# Patient Record
Sex: FEMALE | Race: OTHER | HISPANIC OR LATINO | Employment: UNEMPLOYED | URBAN - METROPOLITAN AREA
[De-identification: names, ages, dates, MRNs, and addresses within clinical notes are randomized per-mention and may not be internally consistent; named-entity substitution may affect disease eponyms.]

---

## 2024-11-19 ENCOUNTER — TELEPHONE (OUTPATIENT)
Dept: HEMATOLOGY ONCOLOGY | Facility: MEDICAL CENTER | Age: 29
End: 2024-11-19

## 2024-11-19 ENCOUNTER — TELEPHONE (OUTPATIENT)
Dept: URGENT CARE | Facility: PHYSICIAN GROUP | Age: 29
End: 2024-11-19
Payer: COMMERCIAL

## 2024-11-20 NOTE — TELEPHONE ENCOUNTER
Patient called, asking how she could get an emergency iron infusion.    Patient currently sees a holostic doctor, and had labs drawn.  She is 6 weeks pregnant, and anemic.    PAR advised that the patient get a referral faxed in with the pertinent medical records.

## 2024-11-20 NOTE — TELEPHONE ENCOUNTER
Pt called and LVM asking if she can check into an UC to get a blood infusion performed. She noted that she pregnant and anemic and needing to get an emergency one dose as soon as possible. Talked to provider Serafin Tsai PA-C in office who noted that patient will need to be seen by oncologist/hematologist/ her OBGYN to get this ordered and will likely need to get it done at an infusion center. Notified pt of this and pt understood.

## 2024-11-22 ENCOUNTER — RESEARCH ENCOUNTER (OUTPATIENT)
Dept: RESEARCH | Facility: MEDICAL CENTER | Age: 29
End: 2024-11-22

## 2024-11-22 ENCOUNTER — OFFICE VISIT (OUTPATIENT)
Dept: MEDICAL GROUP | Facility: MEDICAL CENTER | Age: 29
End: 2024-11-22
Payer: COMMERCIAL

## 2024-11-22 VITALS
SYSTOLIC BLOOD PRESSURE: 124 MMHG | WEIGHT: 150 LBS | BODY MASS INDEX: 24.99 KG/M2 | DIASTOLIC BLOOD PRESSURE: 58 MMHG | TEMPERATURE: 99 F | OXYGEN SATURATION: 98 % | HEIGHT: 65 IN | HEART RATE: 72 BPM

## 2024-11-22 DIAGNOSIS — Z00.00 ENCOUNTER FOR PREVENTIVE CARE: ICD-10-CM

## 2024-11-22 DIAGNOSIS — E61.1 IRON DEFICIENCY: ICD-10-CM

## 2024-11-22 PROCEDURE — 3078F DIAST BP <80 MM HG: CPT | Performed by: STUDENT IN AN ORGANIZED HEALTH CARE EDUCATION/TRAINING PROGRAM

## 2024-11-22 PROCEDURE — 99204 OFFICE O/P NEW MOD 45 MIN: CPT | Performed by: STUDENT IN AN ORGANIZED HEALTH CARE EDUCATION/TRAINING PROGRAM

## 2024-11-22 PROCEDURE — 3074F SYST BP LT 130 MM HG: CPT | Performed by: STUDENT IN AN ORGANIZED HEALTH CARE EDUCATION/TRAINING PROGRAM

## 2024-11-22 SDOH — ECONOMIC STABILITY: INCOME INSECURITY: IN THE LAST 12 MONTHS, WAS THERE A TIME WHEN YOU WERE NOT ABLE TO PAY THE MORTGAGE OR RENT ON TIME?: NO

## 2024-11-22 SDOH — ECONOMIC STABILITY: HOUSING INSECURITY
IN THE LAST 12 MONTHS, WAS THERE A TIME WHEN YOU DID NOT HAVE A STEADY PLACE TO SLEEP OR SLEPT IN A SHELTER (INCLUDING NOW)?: NO

## 2024-11-22 SDOH — HEALTH STABILITY: PHYSICAL HEALTH: ON AVERAGE, HOW MANY MINUTES DO YOU ENGAGE IN EXERCISE AT THIS LEVEL?: 30 MIN

## 2024-11-22 SDOH — ECONOMIC STABILITY: INCOME INSECURITY: HOW HARD IS IT FOR YOU TO PAY FOR THE VERY BASICS LIKE FOOD, HOUSING, MEDICAL CARE, AND HEATING?: NOT VERY HARD

## 2024-11-22 SDOH — HEALTH STABILITY: MENTAL HEALTH
STRESS IS WHEN SOMEONE FEELS TENSE, NERVOUS, ANXIOUS, OR CAN'T SLEEP AT NIGHT BECAUSE THEIR MIND IS TROUBLED. HOW STRESSED ARE YOU?: ONLY A LITTLE

## 2024-11-22 SDOH — ECONOMIC STABILITY: FOOD INSECURITY: WITHIN THE PAST 12 MONTHS, YOU WORRIED THAT YOUR FOOD WOULD RUN OUT BEFORE YOU GOT MONEY TO BUY MORE.: NEVER TRUE

## 2024-11-22 SDOH — ECONOMIC STABILITY: TRANSPORTATION INSECURITY
IN THE PAST 12 MONTHS, HAS LACK OF TRANSPORTATION KEPT YOU FROM MEETINGS, WORK, OR FROM GETTING THINGS NEEDED FOR DAILY LIVING?: NO

## 2024-11-22 SDOH — HEALTH STABILITY: PHYSICAL HEALTH: ON AVERAGE, HOW MANY DAYS PER WEEK DO YOU ENGAGE IN MODERATE TO STRENUOUS EXERCISE (LIKE A BRISK WALK)?: 4 DAYS

## 2024-11-22 SDOH — ECONOMIC STABILITY: TRANSPORTATION INSECURITY
IN THE PAST 12 MONTHS, HAS LACK OF RELIABLE TRANSPORTATION KEPT YOU FROM MEDICAL APPOINTMENTS, MEETINGS, WORK OR FROM GETTING THINGS NEEDED FOR DAILY LIVING?: NO

## 2024-11-22 SDOH — ECONOMIC STABILITY: FOOD INSECURITY: WITHIN THE PAST 12 MONTHS, THE FOOD YOU BOUGHT JUST DIDN'T LAST AND YOU DIDN'T HAVE MONEY TO GET MORE.: NEVER TRUE

## 2024-11-22 SDOH — ECONOMIC STABILITY: TRANSPORTATION INSECURITY
IN THE PAST 12 MONTHS, HAS THE LACK OF TRANSPORTATION KEPT YOU FROM MEDICAL APPOINTMENTS OR FROM GETTING MEDICATIONS?: NO

## 2024-11-22 ASSESSMENT — SOCIAL DETERMINANTS OF HEALTH (SDOH)
IN A TYPICAL WEEK, HOW MANY TIMES DO YOU TALK ON THE PHONE WITH FAMILY, FRIENDS, OR NEIGHBORS?: MORE THAN THREE TIMES A WEEK
HOW MANY DRINKS CONTAINING ALCOHOL DO YOU HAVE ON A TYPICAL DAY WHEN YOU ARE DRINKING: PATIENT DOES NOT DRINK
HOW OFTEN DO YOU HAVE SIX OR MORE DRINKS ON ONE OCCASION: NEVER
HOW OFTEN DO YOU ATTENT MEETINGS OF THE CLUB OR ORGANIZATION YOU BELONG TO?: MORE THAN 4 TIMES PER YEAR
HOW OFTEN DO YOU GET TOGETHER WITH FRIENDS OR RELATIVES?: ONCE A WEEK
WITHIN THE PAST 12 MONTHS, YOU WORRIED THAT YOUR FOOD WOULD RUN OUT BEFORE YOU GOT THE MONEY TO BUY MORE: NEVER TRUE
DO YOU BELONG TO ANY CLUBS OR ORGANIZATIONS SUCH AS CHURCH GROUPS UNIONS, FRATERNAL OR ATHLETIC GROUPS, OR SCHOOL GROUPS?: YES
HOW OFTEN DO YOU GET TOGETHER WITH FRIENDS OR RELATIVES?: ONCE A WEEK
HOW HARD IS IT FOR YOU TO PAY FOR THE VERY BASICS LIKE FOOD, HOUSING, MEDICAL CARE, AND HEATING?: NOT VERY HARD
IN A TYPICAL WEEK, HOW MANY TIMES DO YOU TALK ON THE PHONE WITH FAMILY, FRIENDS, OR NEIGHBORS?: MORE THAN THREE TIMES A WEEK
HOW OFTEN DO YOU ATTEND CHURCH OR RELIGIOUS SERVICES?: MORE THAN 4 TIMES PER YEAR
IN THE PAST 12 MONTHS, HAS THE ELECTRIC, GAS, OIL, OR WATER COMPANY THREATENED TO SHUT OFF SERVICE IN YOUR HOME?: NO
HOW OFTEN DO YOU ATTENT MEETINGS OF THE CLUB OR ORGANIZATION YOU BELONG TO?: MORE THAN 4 TIMES PER YEAR
HOW OFTEN DO YOU HAVE A DRINK CONTAINING ALCOHOL: NEVER
DO YOU BELONG TO ANY CLUBS OR ORGANIZATIONS SUCH AS CHURCH GROUPS UNIONS, FRATERNAL OR ATHLETIC GROUPS, OR SCHOOL GROUPS?: YES
HOW OFTEN DO YOU ATTEND CHURCH OR RELIGIOUS SERVICES?: MORE THAN 4 TIMES PER YEAR

## 2024-11-22 ASSESSMENT — ENCOUNTER SYMPTOMS
COUGH: 0
HEMOPTYSIS: 0
FEVER: 0
CHILLS: 0
PALPITATIONS: 0

## 2024-11-22 ASSESSMENT — PATIENT HEALTH QUESTIONNAIRE - PHQ9
SUM OF ALL RESPONSES TO PHQ QUESTIONS 1-9: 7
5. POOR APPETITE OR OVEREATING: 0 - NOT AT ALL
CLINICAL INTERPRETATION OF PHQ2 SCORE: 5

## 2024-11-22 ASSESSMENT — LIFESTYLE VARIABLES
AUDIT-C TOTAL SCORE: 0
HOW MANY STANDARD DRINKS CONTAINING ALCOHOL DO YOU HAVE ON A TYPICAL DAY: PATIENT DOES NOT DRINK
SKIP TO QUESTIONS 9-10: 1
HOW OFTEN DO YOU HAVE A DRINK CONTAINING ALCOHOL: NEVER
HOW OFTEN DO YOU HAVE SIX OR MORE DRINKS ON ONE OCCASION: NEVER

## 2024-11-22 NOTE — PROGRESS NOTES
"Ruth Balbuena is a 28 y.o. old female  who is here to establish care   Chief Complaint   Patient presents with    Establish Care     New to You    Other     Patient just found out she is pregnant// patient has gotten a full panel of labs// constipation/               Patient is here to establish care. She follows holistic medicine provider and received iron infusion previously in 2020. She had fibroid and increased bleeding previously during menstruation lasting for  7 to 15. She has history of PCOS and has not taken treatment. She recently found that she is pregnant in October.   Lab reports from outside lab in October shows Total iron normal range , percent saturation  14 (normal range 16 to 45), 65 percent, ferritin 14(normal 16 to 154). Hb at upper limit of normal  Last Pap smear 2020     Review of Systems   Constitutional:  Negative for chills and fever.   Respiratory:  Negative for cough and hemoptysis.    Cardiovascular:  Negative for chest pain and palpitations.        Patient  has no past medical history on file.  Patient  has no past surgical history on file.  History reviewed. No pertinent family history.  Social History     Tobacco Use    Smoking status: Never    Smokeless tobacco: Never   Vaping Use    Vaping status: Never Used   Substance Use Topics    Alcohol use: Never    Drug use: Never     Patient Active Problem List    Diagnosis Date Noted    Iron deficiency 11/22/2024     No current outpatient medications on file.     No current facility-administered medications for this visit.    (including changes today)  Allergies: Seasonal    /58 (BP Location: Right arm, Patient Position: Sitting, BP Cuff Size: Adult)   Pulse 72   Temp 37.2 °C (99 °F) (Temporal)   Ht 1.651 m (5' 5\")   Wt 68 kg (150 lb)   SpO2 98%      Physical Exam  Constitutional:       Appearance: Normal appearance.   Cardiovascular:      Pulses: Normal pulses.      Heart sounds: Normal heart sounds. No murmur heard.  Pulmonary: "      Effort: Pulmonary effort is normal. No respiratory distress.      Breath sounds: Normal breath sounds.   Neurological:      Mental Status: She is alert and oriented to person, place, and time.          Health maintenance:   Last Pap smear 2020  I reviewed with patient  lab results     Assessment and plan:    Problem List Items Addressed This Visit       Iron deficiency     -Her Hb in normal range. She can take oral iron. Iron deficiency secondary to increased mens bleeding.Given she is pregnant and last mens cycle more than a month ago, I will hold off on iron infusion at this time.She has an upcoming appointment with Ob gyn          Other Visit Diagnoses       Encounter for preventive care        Relevant Orders    Referral to Genetic Research Studies                Return if symptoms worsen or fail to improve.          Please note that this dictation was created using voice recognition software. I have made every reasonable attempt to correct obvious errors, but I expect that there are errors of grammar and possibly content that I did not discover before finalizing the note.

## 2024-11-22 NOTE — RESEARCH NOTE
Patient has been referred by Jessy Woo M.D. The initial referral follow-up message, which includes the consent form link, has been sent to the patient.    Eligible Studies: HNP

## 2024-11-25 NOTE — ASSESSMENT & PLAN NOTE
-Her Hb in normal range. She can take oral iron. Iron deficiency secondary to increased mens bleeding.Given she is pregnant and last mens cycle more than a month ago, I will hold off on iron infusion at this time.She has an upcoming appointment with Ob gyn

## 2024-12-05 NOTE — PROGRESS NOTES
Pregnancy Confirmation Clinic Note    Ruth Balbuena is a 28 y.o.  who presents due to missed menses with a +UPT.     CC: Missed menses and +UPT    HPI:  Patient was seen in the ER on  for vaginal bleeding, per patient found to have subchorionic hemorrhage. Also found to have UTI and given Keflex antibiotics. Also orally hydrated.     Actively seeking pregnancy since February.    History of PCOS? Uterine Fibroid. Was seeing an infertility doctor in [place]. Was not receiving fertility care.    Patient's last menstrual period was 10/04/2024 (exact date)..    She is sure of her LMP.    Based on LMP, she is 9w6d today.     She was not using anything for birth control.    This is was not a planned pregnancy.    She reports nausea, denies vomiting, reports vaginal bleeding/spotting, and reports cramping.      OB History:    OB History    Para Term  AB Living   1             SAB IAB Ectopic Molar Multiple Live Births                    # Outcome Date GA Lbr [place]/2nd Weight Sex Type Anes PTL Lv   1 Current                ROS:  Gen: denies fevers/chills, denies changes in weight  Pulm: denies shortness of breath, denies cough  CV: denies chest pain, denies palpitations  GI: +nausea, -vomiting, denies diarrhea or constipation  : denies dysuria, denies vaginal discharge or odor  Skin: denies rash     Objective     Vitals:  /67   Wt 155 lb   Body mass index is 25.79 kg/m². (Goal BMI >18 and <25)    Exam:  Gen: Alert and oriented, No apparent distress.  Lungs: Breathing comfortably on room air, no cough  CV: Extremities are warm and well perfused, no BLE edema  MSK: Normal movement of extremities, gait normal    :  Vulva: normal.    Urethra: normal.   Vagina: normal.    Cervix: normal.    Uterus: normal   Adnexa: not palpable   Perineum: normal.    Chaperone name: Dolly was present during exam.      Procedure:  Transvaginal US performed by me and per my read:    Indication: Missed menses,  +UPT.     Findings:   Single intrauterine pregnancy @ 9w6d by LMP  yolk sac: present  fetal cardiac activity: 178  Right ovary not visualized  Left Ovary not visualized  free fluid in the cul-de-sac: not visualized    Impression:   Viable SIUP @ 10w5d.  RADHA by US of 25  RADHA by LMP: 2025  Final RADHA: 25       Assessment and Plan     Ruth Balbuena is 28 y.o.  here for pregnancy confirmation visit.    #SIUP at 9wk 6days by sure LMP c/w 9wk US, RADHA 25.   - Normal pregnancy s/sx discussed  - Continue prenatal vitamins once daily, any brand is OK  - Prenatal Labs ordered  - Counseled on aneuploidy and carrier screening tests:   She currently accept aneuploidy screenings and accept carrier screening  - SAB precautions discussed.  - Discussed the size of our practice and that she will see multiple providers during the course of her care. She expresses understanding.     #subchorionic hemorrhage  #vaginal bleeding in first trimester  - patient requesting additional ultrasound for reassurance    #Nausea/Vomiting  - Reviewed starting treatment for routine N/V of pregnancy if present with Vitamin B6 10mg three times daily and Unisom 10mg nightly.   - Call for prescription if this is not adequately treating N/V.    #excess weight gain  #pre-preg 145 lb, BMI 24  - BMI 18.5 - 24.9: IOM weight gain goal 25-35 lb for entire pregnancy  - total weight gain so far: 10lb  - encourage daily exercise, at least 30 minutes  - continue healthy diet with mostly fruit and vegetables  - Increase water intake and encouraged healthy nutrition.   - Encouraged moderate exercise may continue into final trimester.     #pre-eclampsia prophylaxis  - not recommend aspirin 81mg once daily for duration of pregnancy  - risk factors: nulliparity    #cervical cancer screening  - collected today  - if normal, next due in 5 years    #Health Care Maintenance  - Flu vaccine, recommended annually  - COVID vaccines, recommended, vaccinated  x3  - Tdap vaccine after 28 weeks gestation  - Continue routine dental care twice per year    1. Encounter for supervision of normal first pregnancy in first trimester  - Cystic Fibrosis (CFTR) Expanded Variant Panel; Future  - VARICELLA ZOSTER IGG AB; Future  - Quantiferon Gold Plus TB (4 tube); Future  - PRENATAL PANEL 3+HIV+UACXI; Future  - PANORAMA PRENATAL TEST  - THINPREP PAP W/HPV AND CTNG; Future  - US-OB 1ST TRIMESTER SINGLE GEST; Future    2. Encounter for pregnancy test, result unknown  - POCT Pregnancy    3. Cervical cancer screening  - THINPREP PAP W/HPV AND CTNG; Future    4. Routine screening for STI (sexually transmitted infection)  - THINPREP PAP W/HPV AND CTNG; Future    5. Subchorionic hemorrhage in first trimester  - US-OB 1ST TRIMESTER SINGLE GEST; Future    Other orders  - Prenatal MV-Min-Fe Fum-FA-DHA (PRENATAL 1 PO); Take  by mouth.  - cephALEXin (KEFLEX) 500 MG Cap  - Magnesium Citrate 125 MG Cap; Take 150 mg by mouth 2 times a day.       No problem-specific Assessment & Plan notes found for this encounter.      Follow up in 4 weeks for next OB visit.    Claudine Peterson MD, MPH

## 2024-12-12 ENCOUNTER — HOSPITAL ENCOUNTER (OUTPATIENT)
Facility: MEDICAL CENTER | Age: 29
End: 2024-12-12
Attending: FAMILY MEDICINE
Payer: COMMERCIAL

## 2024-12-12 ENCOUNTER — INITIAL PRENATAL (OUTPATIENT)
Dept: OBGYN | Facility: CLINIC | Age: 29
End: 2024-12-12
Payer: COMMERCIAL

## 2024-12-12 VITALS — SYSTOLIC BLOOD PRESSURE: 114 MMHG | DIASTOLIC BLOOD PRESSURE: 67 MMHG | WEIGHT: 155 LBS | BODY MASS INDEX: 25.79 KG/M2

## 2024-12-12 DIAGNOSIS — Z12.4 CERVICAL CANCER SCREENING: ICD-10-CM

## 2024-12-12 DIAGNOSIS — Z34.01 ENCOUNTER FOR SUPERVISION OF NORMAL FIRST PREGNANCY IN FIRST TRIMESTER: ICD-10-CM

## 2024-12-12 DIAGNOSIS — Z34.01 ENCOUNTER FOR SUPERVISION OF NORMAL FIRST PREGNANCY IN FIRST TRIMESTER: Primary | ICD-10-CM

## 2024-12-12 DIAGNOSIS — Z32.00 ENCOUNTER FOR PREGNANCY TEST, RESULT UNKNOWN: ICD-10-CM

## 2024-12-12 DIAGNOSIS — Z11.3 ROUTINE SCREENING FOR STI (SEXUALLY TRANSMITTED INFECTION): ICD-10-CM

## 2024-12-12 DIAGNOSIS — O20.8 SUBCHORIONIC HEMORRHAGE IN FIRST TRIMESTER: ICD-10-CM

## 2024-12-12 LAB
POCT INT CON NEG: NEGATIVE
POCT INT CON POS: POSITIVE
POCT URINE PREGNANCY TEST: POSITIVE

## 2024-12-12 PROCEDURE — 88142 CYTOPATH C/V THIN LAYER: CPT

## 2024-12-12 PROCEDURE — 87491 CHLMYD TRACH DNA AMP PROBE: CPT

## 2024-12-12 PROCEDURE — 0500F INITIAL PRENATAL CARE VISIT: CPT | Performed by: FAMILY MEDICINE

## 2024-12-12 PROCEDURE — 87624 HPV HI-RISK TYP POOLED RSLT: CPT

## 2024-12-12 PROCEDURE — 81025 URINE PREGNANCY TEST: CPT | Performed by: FAMILY MEDICINE

## 2024-12-12 PROCEDURE — 87591 N.GONORRHOEAE DNA AMP PROB: CPT

## 2024-12-12 PROCEDURE — 3074F SYST BP LT 130 MM HG: CPT | Performed by: FAMILY MEDICINE

## 2024-12-12 PROCEDURE — 3078F DIAST BP <80 MM HG: CPT | Performed by: FAMILY MEDICINE

## 2024-12-12 RX ORDER — ASCORBIC ACID 125 MG
150 TABLET,CHEWABLE ORAL 2 TIMES DAILY
COMMUNITY

## 2024-12-12 RX ORDER — CEPHALEXIN 500 MG/1
CAPSULE ORAL
COMMUNITY
Start: 2024-12-10

## 2024-12-12 ASSESSMENT — EDINBURGH POSTNATAL DEPRESSION SCALE (EPDS)
I HAVE BEEN SO UNHAPPY THAT I HAVE BEEN CRYING: ONLY OCCASIONALLY
I HAVE BEEN ANXIOUS OR WORRIED FOR NO GOOD REASON: YES, SOMETIMES
THINGS HAVE BEEN GETTING ON TOP OF ME: NO, MOST OF THE TIME I HAVE COPED QUITE WELL
TOTAL SCORE: 8
I HAVE BLAMED MYSELF UNNECESSARILY WHEN THINGS WENT WRONG: NOT VERY OFTEN
I HAVE BEEN SO UNHAPPY THAT I HAVE HAD DIFFICULTY SLEEPING: NOT AT ALL
I HAVE FELT SCARED OR PANICKY FOR NO GOOD REASON: YES, SOMETIMES
I HAVE FELT SAD OR MISERABLE: NOT VERY OFTEN
THE THOUGHT OF HARMING MYSELF HAS OCCURRED TO ME: NEVER
I HAVE BEEN ABLE TO LAUGH AND SEE THE FUNNY SIDE OF THINGS: AS MUCH AS I ALWAYS COULD
I HAVE LOOKED FORWARD WITH ENJOYMENT TO THINGS: AS MUCH AS I EVER DID

## 2024-12-12 NOTE — PROGRESS NOTES
Patient here for New OB   LMP: 10/4/24  RADHA: 7/11/25  GA: 9w6d  Last pap:3+ years, normal   Phone: [phone]  Pharmacy verified   EDPS: 8  Genetic testing: NIPT, and AFP later on   FLU: Declined     Pt states she has records from multiple sources, but she wants to go over her subchronic hemorrhage and make sure she is okay.   ER ([facility]) a couple night ago, is taking antibiotics for UTI, and subchronic hemorrhage shown on US

## 2024-12-13 ENCOUNTER — TELEPHONE (OUTPATIENT)
Dept: OBGYN | Facility: CLINIC | Age: 29
End: 2024-12-13
Payer: COMMERCIAL

## 2024-12-13 LAB
C TRACH DNA GENITAL QL NAA+PROBE: NEGATIVE
N GONORRHOEA DNA GENITAL QL NAA+PROBE: NEGATIVE
SPECIMEN SOURCE: NORMAL

## 2024-12-13 NOTE — TELEPHONE ENCOUNTER
Caller Name: Ruth Balbuena    Call Back Number: [phone]      How would the patient prefer to be contacted with a response: Phone call do NOT leave a detailed message    Pt called asking if provider can change us for stat since ultra sound is all the way out to the 16 and she as a appointment that day with the provider. Pt states provider will like her to get it done soon.   Send message to provider MA, she will call after lunch to get more information.

## 2024-12-13 NOTE — TELEPHONE ENCOUNTER
Phone Number Called: [phone]\    Call outcome: Spoke to patient regarding message below.    Message: Pt states that won't be able to get in to get that follow up US until 1/16 @ 4:30 which in the afternoon after her appt with us on 1/16 for her follow up. She states that she is wondering if we can have another doctor in office redo the US or have a STAT order placed so she can get it sooner before her next OBFV. I informed patient no matter what provider does it in office, it will be considered an unoffical, therefore, I will ask Dr. Peterson if we can get a different order placed, such as STAT, so we can get it sooner if she thinks the indication for that is needed. PT understood and will await my response.

## 2024-12-17 ENCOUNTER — HOSPITAL ENCOUNTER (OUTPATIENT)
Dept: LAB | Facility: MEDICAL CENTER | Age: 29
End: 2024-12-17
Attending: FAMILY MEDICINE
Payer: COMMERCIAL

## 2024-12-17 DIAGNOSIS — Z34.01 ENCOUNTER FOR SUPERVISION OF NORMAL FIRST PREGNANCY IN FIRST TRIMESTER: ICD-10-CM

## 2024-12-17 LAB
ABO GROUP BLD: NORMAL
APPEARANCE UR: CLEAR
BACTERIA #/AREA URNS HPF: ABNORMAL /HPF
BASOPHILS # BLD AUTO: 0.6 % (ref 0–1.8)
BASOPHILS # BLD: 0.06 K/UL (ref 0–0.12)
BILIRUB UR QL STRIP.AUTO: NEGATIVE
BLD GP AB SCN SERPL QL: NORMAL
CASTS URNS QL MICRO: ABNORMAL /LPF (ref 0–2)
COLOR UR: YELLOW
EOSINOPHIL # BLD AUTO: 0.24 K/UL (ref 0–0.51)
EOSINOPHIL NFR BLD: 2.3 % (ref 0–6.9)
EPITHELIAL CELLS 1715: ABNORMAL /HPF (ref 0–5)
ERYTHROCYTE [DISTWIDTH] IN BLOOD BY AUTOMATED COUNT: 40.8 FL (ref 35.9–50)
GLUCOSE UR STRIP.AUTO-MCNC: NEGATIVE MG/DL
HBV SURFACE AG SER QL: ABNORMAL
HCT VFR BLD AUTO: 42.9 % (ref 37–47)
HGB BLD-MCNC: 13.9 G/DL (ref 12–16)
HIV 1+2 AB+HIV1 P24 AG SERPL QL IA: NORMAL
IMM GRANULOCYTES # BLD AUTO: 0.05 K/UL (ref 0–0.11)
IMM GRANULOCYTES NFR BLD AUTO: 0.5 % (ref 0–0.9)
KETONES UR STRIP.AUTO-MCNC: NEGATIVE MG/DL
LEUKOCYTE ESTERASE UR QL STRIP.AUTO: NEGATIVE
LYMPHOCYTES # BLD AUTO: 2.09 K/UL (ref 1–4.8)
LYMPHOCYTES NFR BLD: 20 % (ref 22–41)
MCH RBC QN AUTO: 27.4 PG (ref 27–33)
MCHC RBC AUTO-ENTMCNC: 32.4 G/DL (ref 32.2–35.5)
MCV RBC AUTO: 84.6 FL (ref 81.4–97.8)
MICRO URNS: ABNORMAL
MONOCYTES # BLD AUTO: 0.56 K/UL (ref 0–0.85)
MONOCYTES NFR BLD AUTO: 5.3 % (ref 0–13.4)
NEUTROPHILS # BLD AUTO: 7.47 K/UL (ref 1.82–7.42)
NEUTROPHILS NFR BLD: 71.3 % (ref 44–72)
NITRITE UR QL STRIP.AUTO: NEGATIVE
NRBC # BLD AUTO: 0 K/UL
NRBC BLD-RTO: 0 /100 WBC (ref 0–0.2)
PH UR STRIP.AUTO: 7 [PH] (ref 5–8)
PLATELET # BLD AUTO: 421 K/UL (ref 164–446)
PMV BLD AUTO: 10.1 FL (ref 9–12.9)
PROT UR QL STRIP: NEGATIVE MG/DL
RBC # BLD AUTO: 5.07 M/UL (ref 4.2–5.4)
RBC # URNS HPF: ABNORMAL /HPF (ref 0–2)
RBC UR QL AUTO: ABNORMAL
RH BLD: NORMAL
RUBV AB SER QL: 115 IU/ML
SP GR UR STRIP.AUTO: 1.02
T PALLIDUM AB SER QL IA: ABNORMAL
UROBILINOGEN UR STRIP.AUTO-MCNC: 1 EU/DL
WBC # BLD AUTO: 10.5 K/UL (ref 4.8–10.8)
WBC #/AREA URNS HPF: ABNORMAL /HPF

## 2024-12-17 PROCEDURE — 86850 RBC ANTIBODY SCREEN: CPT

## 2024-12-17 PROCEDURE — 85025 COMPLETE CBC W/AUTO DIFF WBC: CPT

## 2024-12-17 PROCEDURE — 86780 TREPONEMA PALLIDUM: CPT

## 2024-12-17 PROCEDURE — 86762 RUBELLA ANTIBODY: CPT

## 2024-12-17 PROCEDURE — 36415 COLL VENOUS BLD VENIPUNCTURE: CPT

## 2024-12-17 PROCEDURE — 87389 HIV-1 AG W/HIV-1&-2 AB AG IA: CPT

## 2024-12-17 PROCEDURE — 81220 CFTR GENE COM VARIANTS: CPT

## 2024-12-17 PROCEDURE — 81001 URINALYSIS AUTO W/SCOPE: CPT

## 2024-12-17 PROCEDURE — 86592 SYPHILIS TEST NON-TREP QUAL: CPT

## 2024-12-17 PROCEDURE — 86901 BLOOD TYPING SEROLOGIC RH(D): CPT

## 2024-12-17 PROCEDURE — 87340 HEPATITIS B SURFACE AG IA: CPT

## 2024-12-17 PROCEDURE — 86900 BLOOD TYPING SEROLOGIC ABO: CPT

## 2024-12-17 PROCEDURE — 86787 VARICELLA-ZOSTER ANTIBODY: CPT

## 2024-12-17 PROCEDURE — 86480 TB TEST CELL IMMUN MEASURE: CPT

## 2024-12-18 ENCOUNTER — PATIENT MESSAGE (OUTPATIENT)
Dept: OBGYN | Facility: CLINIC | Age: 29
End: 2024-12-18
Payer: COMMERCIAL

## 2024-12-18 LAB
GAMMA INTERFERON BACKGROUND BLD IA-ACNC: 0.06 IU/ML
M TB IFN-G BLD-IMP: NEGATIVE
M TB IFN-G CD4+ BCKGRND COR BLD-ACNC: 0.01 IU/ML
MITOGEN IGNF BCKGRD COR BLD-ACNC: >10 IU/ML
QFT TB2 - NIL TBQ2: 0 IU/ML

## 2024-12-18 NOTE — TELEPHONE ENCOUNTER
Phone Number Called: [phone]    Call outcome: Spoke to patient regarding message below.    Message: Called patient 12/18 so we could discuss the few questions pt had. I relayed the message in regards to her desiring to get a STAT US - Dr. Peterson sent the following that I told patient     She does not need a STAT US. She can reschedule her prenatal appointment if she wants to have the US results first. She can also call radiology to reschedule the US without a new order. I am OK seeing her before the US on the 16th.     Patient understood. She also stated she was concerned on a Mychart message - Pt stated she was concerned about her condition and traveling I told her that she is okay to travel, there will be restrictions later on in the pregnancy once she is closer to her due date. I also told her the protocols for excessive bleeding and cramping, and told er that there shouldn't be any major concerns about the hemorrhage as it is common but we still need to watch it.

## 2024-12-19 LAB — VZV IGG SER IA-ACNC: 9.9 S/CO

## 2024-12-24 LAB
HPV I/H RISK 1 DNA SPEC QL NAA+PROBE: NOT DETECTED
Lab: NORMAL
NTRA 1P36 DELETION SYNDROME POPULATION-BASED RISK TEXT: NORMAL
NTRA 1P36 DELETION SYNDROME RESULT TEXT: NORMAL
NTRA 1P36 DELETION SYNDROME RISK SCORE TEXT: NORMAL
NTRA 22Q11.2 DELETION SYNDROME POPULATION-BASED RISK TEXT: NORMAL
NTRA 22Q11.2 DELETION SYNDROME RESULT TEXT: NORMAL
NTRA 22Q11.2 DELETION SYNDROME RISK SCORE TEXT: NORMAL
NTRA ANGELMAN SYNDROME POPULATION-BASED RISK TEXT: NORMAL
NTRA ANGELMAN SYNDROME RESULT TEXT: NORMAL
NTRA ANGELMAN SYNDROME RISK SCORE TEXT: NORMAL
NTRA CRI-DU-CHAT SYNDROME POPULATION-BASED RISK TEXT: NORMAL
NTRA CRI-DU-CHAT SYNDROME RESULT TEXT: NORMAL
NTRA CRI-DU-CHAT SYNDROME RISK SCORE TEXT: NORMAL
NTRA FETAL FRACTION: NORMAL
NTRA GENDER OF FETUS: NORMAL
NTRA MONOSOMY X AGE-BASED RISK TEXT: NORMAL
NTRA MONOSOMY X RESULT TEXT: NORMAL
NTRA MONOSOMY X RISK SCORE TEXT: NORMAL
NTRA PRADER-WILLI SYNDROME POPULATION-BASED RISK TEXT: NORMAL
NTRA PRADER-WILLI SYNDROME RESULT TEXT: NORMAL
NTRA PRADER-WILLI SYNDROME RISK SCORE TEXT: NORMAL
NTRA TRIPLOIDY RESULT TEXT: NORMAL
NTRA TRISOMY 13 AGE-BASED RISK TEXT: NORMAL
NTRA TRISOMY 13 RESULT TEXT: NORMAL
NTRA TRISOMY 13 RISK SCORE TEXT: NORMAL
NTRA TRISOMY 18 AGE-BASED RISK TEXT: NORMAL
NTRA TRISOMY 18 RESULT TEXT: NORMAL
NTRA TRISOMY 18 RISK SCORE TEXT: NORMAL
NTRA TRISOMY 21 AGE-BASED RISK TEXT: NORMAL
NTRA TRISOMY 21 RESULT TEXT: NORMAL
NTRA TRISOMY 21 RISK SCORE TEXT: NORMAL
SPECIMEN SOURCE: NORMAL
THINPREP PAP, CYTOLOGY NL11781: NORMAL

## 2024-12-28 LAB
CF EXPANDED VARIANT PANEL INTERP Q4864: NORMAL
CFTR ALLELE 1 BLD/T QL: NEGATIVE
CFTR ALLELE 1 BLD/T QL: NEGATIVE
CFTR MUT ANL BLD/T: NORMAL

## 2025-01-10 DIAGNOSIS — O03.9 MISCARRIAGE: ICD-10-CM

## 2025-01-12 ENCOUNTER — HOSPITAL ENCOUNTER (OUTPATIENT)
Dept: RADIOLOGY | Facility: MEDICAL CENTER | Age: 30
End: 2025-01-12
Attending: STUDENT IN AN ORGANIZED HEALTH CARE EDUCATION/TRAINING PROGRAM
Payer: COMMERCIAL

## 2025-01-12 DIAGNOSIS — O03.9 MISCARRIAGE: ICD-10-CM

## 2025-01-12 PROCEDURE — 76830 TRANSVAGINAL US NON-OB: CPT

## 2025-01-14 ENCOUNTER — APPOINTMENT (OUTPATIENT)
Dept: OBGYN | Facility: CLINIC | Age: 30
End: 2025-01-14
Payer: COMMERCIAL

## 2025-01-14 ENCOUNTER — TELEPHONE (OUTPATIENT)
Dept: OBGYN | Facility: CLINIC | Age: 30
End: 2025-01-14

## 2025-01-14 NOTE — TELEPHONE ENCOUNTER
Phone Number Called: [phone]    Call outcome: Spoke to patient regarding message below.    Message: Called pt to let her know that I have gotten everything answered so we can get on the same page. I told her that we got her the appt on 1/14/25 with Dr. Rasmussen - I had another provider (Dr. Correa) to get pt orders for HCG and US in the meantime, pt can get US anytime before her appt, and HCG should be done on 1/13, so we can get the results closer to appt. Pt understood and was happy with plan of action. I told pt that I cannot get her the physical forms yet as I have not gotten a response from Manager, however, to save a trip we can have her talk to us about it and get the physical copies or have the previous visit reviewed by Dr. Rasmussen so we can review questions or concerns. Pt understood and was thankful. I also told her that we will need to get that AKASH for the records but it depends on how the US, labs, and plan of care changes between now and then. Pt was super thankful for the answers and will get the appts scheduled.

## 2025-01-14 NOTE — TELEPHONE ENCOUNTER
Phone Number Called: [phone]    Call outcome: Spoke to patient regarding message below.    Message: I spoke to my provider Dr. Rasmussen as she has an opening next week prior to pt's appt 1/16 so she can be seen sooner. I told her the concerns of patient and situation and she agreed that we should see her sooner and get some stuff in.     Called pt to let her know that I got some stuff done, and to get an appt scheduled. Luckily Dr. Rasmussen had an appt 1/14/25 at 1500 for 30 mins, so I got her scheduled and told her I would call her once I have the other information.    [Initial Evaluation] : an initial evaluation [Parents] : parents [Father] : father [FreeTextEntry1] : left leg injury, DOI: 6/7/21

## 2025-01-14 NOTE — TELEPHONE ENCOUNTER
Phone Number Called: [phone]    Call outcome: Spoke to patient regarding message below.    Message: Called patient back to back 1/10/25 to discuss the following:    I called pt in regards to her MyChart message from 1/10/25 @9:12 am. I expressed to her that Dr. Peterson wanted to wait until her 1/16/25 appt to discuss her SAB and further opts. Pt voiced extreme concerns that our office is not taking her care serious and she's concerned for her health as well. After talking with patient she voiced that we need to start doing something either an US or HCG levels so we can check what is going on with her SAB. I told her that we should get the records from her SAB ER visit because I didn't see any records in the chart - she explained to me that she went to the ER while she was traveling in [place]. I understood and tried to understand what I could do to help. I informed that from previous messages sent to Dr. Peterson she stated we will wait until then but patient wanted to be seen sooner. Pt also stated there was missing information on her messages prior to 1/10/25, but on the same message chain, about the Doctors note not having ALL the information on there such as friable cervix, etc. I told her that she has access to her notes from the appt but I will see how I can get these records to her in physical copy. In the meantime I voiced it would be helping to get the other records from [place] so we can move forward with care- as we don't know what care they did or if there is any retention of products of conception. Pt was upset stating that whether having a SAB at home or ER doesn't make a difference. I then voiced that I could see what I can do on my end in regards to getting a sooner appt, getting labs and US in prior too, if I can give physical copy of her records for pt to review, and what I can do with the records from [place].

## 2025-01-16 ENCOUNTER — APPOINTMENT (OUTPATIENT)
Dept: RADIOLOGY | Facility: MEDICAL CENTER | Age: 30
End: 2025-01-16
Attending: FAMILY MEDICINE
Payer: COMMERCIAL